# Patient Record
(demographics unavailable — no encounter records)

---

## 2025-05-13 NOTE — REVIEW OF SYSTEMS
[Fever] : no fever [Chills] : no chills [Night Sweats] : no night sweats [Chest Pain] : no chest pain [Palpitations] : no palpitations [Lower Ext Edema] : no lower extremity edema [Shortness Of Breath] : no shortness of breath [Wheezing] : no wheezing [Cough] : no cough [Dyspnea on Exertion] : no dyspnea on exertion [Abdominal Pain] : abdominal pain [Nausea] : no nausea [Constipation] : no constipation [Diarrhea] : diarrhea [Vomiting] : no vomiting [Heartburn] : heartburn [Melena] : no melena [Dysuria] : no dysuria

## 2025-05-13 NOTE — ASSESSMENT
[FreeTextEntry1] : LUQ pain: Discussed pantoprazole 40mg daily. Check CBC, CMP, UA, ab US. She will schedule with Dr Valdovinos (GI) to discussed EGD, colonoscopy.

## 2025-05-13 NOTE — PHYSICAL EXAM
[No Acute Distress] : no acute distress [Normal Sclera/Conjunctiva] : normal sclera/conjunctiva [PERRL] : pupils equal round and reactive to light [EOMI] : extraocular movements intact [No Respiratory Distress] : no respiratory distress  [No Accessory Muscle Use] : no accessory muscle use [Clear to Auscultation] : lungs were clear to auscultation bilaterally [Normal Rate] : normal rate  [Regular Rhythm] : with a regular rhythm [Normal S1, S2] : normal S1 and S2 [Soft] : abdomen soft [Non-distended] : non-distended [Normal Bowel Sounds] : normal bowel sounds [de-identified] : moderate TTP in LUQ

## 2025-05-13 NOTE — HISTORY OF PRESENT ILLNESS
[FreeTextEntry8] : 54F presents for 2 months of LUQ pain. The pain is now daily and radiates to the back. Denies fever, chills, N/V, diarrhea, blood in stool. Has tried prilosec which hasn't helped.